# Patient Record
Sex: MALE | Race: WHITE | Employment: FULL TIME | URBAN - METROPOLITAN AREA
[De-identification: names, ages, dates, MRNs, and addresses within clinical notes are randomized per-mention and may not be internally consistent; named-entity substitution may affect disease eponyms.]

---

## 2019-04-11 ENCOUNTER — OFFICE VISIT (OUTPATIENT)
Dept: FAMILY MEDICINE | Facility: CLINIC | Age: 54
End: 2019-04-11
Payer: COMMERCIAL

## 2019-04-11 VITALS
HEART RATE: 63 BPM | DIASTOLIC BLOOD PRESSURE: 76 MMHG | TEMPERATURE: 97.2 F | OXYGEN SATURATION: 97 % | HEIGHT: 71 IN | WEIGHT: 198 LBS | SYSTOLIC BLOOD PRESSURE: 124 MMHG | BODY MASS INDEX: 27.72 KG/M2

## 2019-04-11 DIAGNOSIS — M79.604 PAIN OF RIGHT LOWER EXTREMITY: Primary | ICD-10-CM

## 2019-04-11 DIAGNOSIS — M79.671 RIGHT FOOT PAIN: ICD-10-CM

## 2019-04-11 PROCEDURE — 99214 OFFICE O/P EST MOD 30 MIN: CPT | Performed by: FAMILY MEDICINE

## 2019-04-11 ASSESSMENT — MIFFLIN-ST. JEOR: SCORE: 1742.31

## 2019-04-11 NOTE — PATIENT INSTRUCTIONS
Proceed with ultrasound .  Cares and symptomatic treatment discussed follow up if problem

## 2019-04-11 NOTE — PROGRESS NOTES
SUBJECTIVE:   Mark Ocampo is a 54 year old male who presents to clinic today for the following   health issues:      Behind RIGHT Knee  Pain/DVT check     Onset: x 2 months     Description:   Location: inside of medial side right knee, 2 months , couple of days ago suddenly felt  tightness, lump behind the knee in popliteal area , pressure  In calf but nothing too bad ,no calf swelling. No recall of injury but had been golfing and usually on his feet a lot   Character: Dull ache and sharp ain     Intensity: moderate, severe    Progression of Symptoms: worse ,     Accompanying Signs & Symptoms:  no risk of DV, no FAM hx of DVT etc but just  wanted to get checked   Other symptoms: rt  foot has been sore on and off few weeks , but leg pain started later. No swelling of feet . No fever or chills no sob etc     History:   Previous similar pain: no       Precipitating factors:   Trauma or overuse: no     Alleviating factors:  Improved by: nothing    Therapies Tried and outcome:         Additional history: as documented    Reviewed  and updated as needed this visit by clinical staff         Reviewed and updated as needed this visit by Provider         Patient Active Problem List   Diagnosis     Spinal stenosis, lumbar region, without neurogenic claudication     Hypertrophy of breast     Hyperhydrosis disorder     Shoulder pain, left     Past Surgical History:   Procedure Laterality Date     C SORTO W/O FACETEC FORAMOT/[facility]  VRT SEG, LUMBAR      of L5 done      C TWIST DRILL HOLE SUBDURAL/VENT On license of [facility]      1981 for SDH       Social History     Tobacco Use     Smoking status: Former Smoker     Packs/day: 1.00     Years: 12.00     Pack years: 12.00     Types: Cigarettes     Last attempt to quit: 9/10/2007     Years since quittin.5     Smokeless tobacco: Never Used   Substance Use Topics     Alcohol use: Yes     Comment: 2 times per month 1-8 per time     Family History   Problem Relation Age of Onset     Diabetes  "Paternal Aunt         type 2     Cerebrovascular Disease Paternal Grandfather      Heart Disease Paternal Grandfather         heart attacks     Breast Cancer Paternal Aunt      Breast Cancer Sister      Breast Cancer Maternal Aunt      Alcohol/Drug Mother         alcohol     Arthritis Mother         osteo     Gastrointestinal Disease Mother         gallbladder     Eye Disorder Paternal Grandmother         cateracts     Gastrointestinal Disease Maternal Uncle         gallbladder, ulcers     Gastrointestinal Disease Paternal Aunt         colostomy     Gastrointestinal Disease Sister         colon problems     Gynecology Sister         hyst     Psychotic Disorder Son         ADHD     Cerebrovascular Disease Father            ROS:  Constitutional, HEENT, cardiovascular, pulmonary, GI, , musculoskeletal, neuro, skin, endocrine and psych systems are negative, except as otherwise noted.    OBJECTIVE:     /76   Pulse 63   Temp 97.2  F (36.2  C) (Tympanic)   Ht 1.791 m (5' 10.5\")   Wt 89.8 kg (198 lb)   SpO2 97%   BMI 28.01 kg/m    Body mass index is 28.01 kg/m .  GENERAL: healthy, alert and no distress  NECK: no adenopathy,   RESP: lungs clear to auscultation - no rales, rhonchi or wheezes  CV: regular rate and rhythm, normal S1 S2, no S3 or S4, no murmur, click or rub  ABDOMEN: soft, nontender, no hepatosplenomegaly, no masses and bowel sounds normal  MS: Right knee good range of motion, there is tenderness mostly in the popliteal area and feels slightly swollen, no other tenderness around the joint, no no leg edema and tenderness to palpation in the calf.  Homans sign is equivocal.   Right foot also has a localized tenderness along the lateral mid arch is a bony prominence no obvious swelling erythema , range of motion of ankle is good.  NEURO: Normal strength and tone, mentation intact and speech normal.          ASSESSMENT/PLAN:       (M79.604) Pain of right lower extremity  (primary encounter " diagnosis)  Comment: has pain in rt poplitea area mostly   Plan: US Lower Extremity Venous Duplex Right      Discussed possible differential diagnosis for her symptoms.  Popliteal cyst versus rule out DVT.  Patient willing to proceed with ultrasound  Talked about warning s/s for which should be seen urgently     In the meantime symptomatic care as discussed.  Follow-up as needed      (M79.671) Right foot pain  Comment: localized later mid arch , not related to leg pain  Plan:   Discussed feet cares. Talked about comfortable shoes, and OTC pain med's as needed.  Cares and symptomatic treatment discussed follow up if problem     Patient expressed understanding and agreement with treatment plan. All patient's questions were answered, will let me know if has more later.  Medications: Rx's: Reviewed the potential side effects/complications of medications prescribed.       Miriam Fernandez MD  [facility] [place]

## 2019-04-12 ENCOUNTER — HOSPITAL ENCOUNTER (OUTPATIENT)
Dept: ULTRASOUND IMAGING | Facility: CLINIC | Age: 54
Discharge: HOME | End: 2019-04-12
Attending: FAMILY MEDICINE | Admitting: FAMILY MEDICINE
Payer: COMMERCIAL

## 2019-04-12 DIAGNOSIS — M79.604 PAIN OF RIGHT LOWER EXTREMITY: ICD-10-CM

## 2019-04-12 PROCEDURE — 93971 EXTREMITY STUDY: CPT | Mod: RT

## 2019-04-17 ENCOUNTER — OFFICE VISIT (OUTPATIENT)
Dept: FAMILY MEDICINE | Facility: CLINIC | Age: 54
End: 2019-04-17
Payer: COMMERCIAL

## 2019-04-17 VITALS
SYSTOLIC BLOOD PRESSURE: 120 MMHG | TEMPERATURE: 97.3 F | DIASTOLIC BLOOD PRESSURE: 70 MMHG | BODY MASS INDEX: 27.77 KG/M2 | WEIGHT: 194 LBS | HEIGHT: 70 IN | HEART RATE: 68 BPM

## 2019-04-17 DIAGNOSIS — Z13.6 CARDIOVASCULAR SCREENING; LDL GOAL LESS THAN 160: Primary | ICD-10-CM

## 2019-04-17 DIAGNOSIS — Z12.11 SCREEN FOR COLON CANCER: ICD-10-CM

## 2019-04-17 DIAGNOSIS — Z12.5 SCREENING FOR PROSTATE CANCER: ICD-10-CM

## 2019-04-17 DIAGNOSIS — Z00.00 ANNUAL PHYSICAL EXAM: ICD-10-CM

## 2019-04-17 PROCEDURE — G0103 PSA SCREENING: HCPCS | Performed by: FAMILY MEDICINE

## 2019-04-17 PROCEDURE — 99396 PREV VISIT EST AGE 40-64: CPT | Performed by: FAMILY MEDICINE

## 2019-04-17 PROCEDURE — 36415 COLL VENOUS BLD VENIPUNCTURE: CPT | Performed by: FAMILY MEDICINE

## 2019-04-17 PROCEDURE — 80053 COMPREHEN METABOLIC PANEL: CPT | Performed by: FAMILY MEDICINE

## 2019-04-17 PROCEDURE — 80061 LIPID PANEL: CPT | Performed by: FAMILY MEDICINE

## 2019-04-17 SDOH — HEALTH STABILITY: MENTAL HEALTH: HOW OFTEN DO YOU HAVE A DRINK CONTAINING ALCOHOL?: 2-4 TIMES A MONTH

## 2019-04-17 ASSESSMENT — MIFFLIN-ST. JEOR: SCORE: 1716.23

## 2019-04-17 NOTE — PROGRESS NOTES
SUBJECTIVE:   CC: Mark Ocampo is an 54 year old male who presents for preventive health visit.     Healthy Habits:    Do you get at least three servings of calcium containing foods daily (dairy, green leafy vegetables, etc.)? yes    Amount of exercise or daily activities, outside of work: 7 days per week, less now due to knee pain    Problems taking medications regularly not applicable    Medication side effects: No    Have you had an eye exam in the past two years? no    Do you see a dentist twice per year? no    Do you have sleep apnea, excessive snoring or daytime drowsiness?no    No concerns.    Today's PHQ-2 Score:   PHQ-2 (  [employer]) 2019   Q1: Little interest or pleasure in doing things 0 0   Q2: Feeling down, depressed or hopeless 0 0   PHQ-2 Score 0 0       Abuse: Current or Past(Physical, Sexual or Emotional)- No  Do you feel safe in your environment? Yes    Social History     Tobacco Use     Smoking status: Former Smoker     Packs/day: 1.00     Years: 12.00     Pack years: 12.00     Types: Cigarettes     Last attempt to quit: 9/10/2007     Years since quittin.6     Smokeless tobacco: Never Used   Substance Use Topics     Alcohol use: Yes     Frequency: 2-4 times a month     If you drink alcohol do you typically have >3 drinks per day or >7 drinks per week? No                      Last PSA: No results found for: PSA    Reviewed orders with patient. Reviewed health maintenance and updated orders accordingly - Yes      Reviewed and updated as needed this visit by clinical staff  Tobacco  Allergies  Meds  Fam Hx  Soc Hx        Reviewed and updated as needed this visit by Provider            ROS:  CONSTITUTIONAL: NEGATIVE for fever, chills, change in weight  INTEGUMENTARY/SKIN: NEGATIVE for worrisome rashes, moles or lesions  EYES: NEGATIVE for vision changes or irritation  ENT: NEGATIVE for ear, mouth and throat problems  RESP: NEGATIVE for significant cough or SOB  CV:  "NEGATIVE for chest pain, palpitations or peripheral edema  GI: NEGATIVE for nausea, abdominal pain, heartburn, or change in bowel habits   male: negative for dysuria, hematuria, decreased urinary stream, erectile dysfunction, urethral discharge  MUSCULOSKELETAL: NEGATIVE for significant arthralgias or myalgia  NEURO: NEGATIVE for weakness, dizziness or paresthesias  PSYCHIATRIC: NEGATIVE for changes in mood or affect    OBJECTIVE:   /70   Pulse 68   Temp 97.3  F (36.3  C) (Tympanic)   Ht 1.778 m (5' 10\")   Wt 88 kg (194 lb)   BMI 27.84 kg/m    EXAM:  GENERAL: healthy, alert and no distress  EYES: Eyes grossly normal to inspection, PERRL and conjunctivae and sclerae normal  HENT: ear canals and TM's normal, nose and mouth without ulcers or lesions  NECK: no adenopathy, no asymmetry, masses, or scars and thyroid normal to palpation  RESP: lungs clear to auscultation - no rales, rhonchi or wheezes  CV: regular rate and rhythm, normal S1 S2, no S3 or S4, no murmur, click or rub, no peripheral edema and peripheral pulses strong  ABDOMEN: soft, nontender, no hepatosplenomegaly, no masses and bowel sounds normal  MS: no gross musculoskeletal defects noted, no edema  SKIN: no suspicious lesions or rashes  NEURO: Normal strength and tone, mentation intact and speech normal  PSYCH: mentation appears normal, affect normal/bright    Diagnostic Test Results:  none     ASSESSMENT/PLAN:   1. Annual physical exam    - Comprehensive metabolic panel  - Lipid panel reflex to direct LDL Fasting    2. Screen for colon cancer    - GASTROENTEROLOGY ADULT REF PROCEDURE ONLY [facility] [phone]    3. CARDIOVASCULAR SCREENING; LDL GOAL LESS THAN 160    - Comprehensive metabolic panel  - Lipid panel reflex to direct LDL Fasting    4. Screening for prostate cancer    - Prostate spec antigen screen    COUNSELING:  Reviewed preventive health counseling, as reflected in patient instructions       Regular exercise       " "Healthy diet/nutrition    BP Readings from Last 1 Encounters:   04/17/19 120/70     Estimated body mass index is 27.84 kg/m  as calculated from the following:    Height as of this encounter: 1.778 m (5' 10\").    Weight as of this encounter: 88 kg (194 lb).           reports that he quit smoking about 11 years ago. His smoking use included cigarettes. He has a 12.00 pack-year smoking history. He has never used smokeless tobacco.      Counseling Resources:  ATP IV Guidelines  Pooled Cohorts Equation Calculator  FRAX Risk Assessment  ICSI Preventive Guidelines  Dietary Guidelines for Americans, 2010  USDA's MyPlate  ASA Prophylaxis  Lung CA Screening    Edinson Burgess MD  [facility] [place]  "

## 2019-04-17 NOTE — LETTER
April 19, 2019      Mark Ocampo  [address] [address]        Dear ,        I have reviewed your recent labs. Here are the results:     -PSA (prostate specific antigen) test is normal.  This indicates a low likelihood of prostate cancer.  ADVISE: rechecking this in 1 year.   -Cholesterol levels (LDL,HDL, Triglycerides) are under range. LDL is mildly elevated but not above goal range.  ADVISE: rechecking  in 1 year.   -Liver and gallbladder tests are normal (ALT,AST, Alk phos, bilirubin), kidney function is normal (Cr, GFR), sodium is normal, potassium is normal, calcium is normal, glucose is normal.     Resulted Orders   Comprehensive metabolic panel   Result Value Ref Range    Sodium 142 133 - 144 mmol/L    Potassium 4.4 3.4 - 5.3 mmol/L    Chloride 108 94 - 109 mmol/L    Carbon Dioxide 26 20 - 32 mmol/L    Anion Gap 8 3 - 14 mmol/L    Glucose 90 70 - 99 mg/dL      Comment:      Non Fasting    Urea Nitrogen 11 7 - 30 mg/dL    Creatinine 0.89 0.66 - 1.25 mg/dL    GFR Estimate >90 >60 mL/min/[1.73_m2]      Comment:      Non  GFR Calc  Starting 12/18/2018, serum creatinine based estimated GFR (eGFR) will be   calculated using the Chronic Kidney Disease Epidemiology Collaboration   (CKD-EPI) equation.      GFR Estimate If Black >90 >60 mL/min/[1.73_m2]      Comment:       GFR Calc  Starting 12/18/2018, serum creatinine based estimated GFR (eGFR) will be   calculated using the Chronic Kidney Disease Epidemiology Collaboration   (CKD-EPI) equation.      Calcium 9.0 8.5 - 10.1 mg/dL    Bilirubin Total 0.6 0.2 - 1.3 mg/dL    Albumin 4.1 3.4 - 5.0 g/dL    Protein Total 7.3 6.8 - 8.8 g/dL    Alkaline Phosphatase 89 40 - 150 U/L    ALT 26 0 - 70 U/L    AST 21 0 - 45 U/L   Lipid panel reflex to direct LDL Fasting   Result Value Ref Range    Cholesterol 195 <200 mg/dL    Triglycerides 56 <150 mg/dL      Comment:      Non Fasting    HDL Cholesterol 50 >39 mg/dL     LDL Cholesterol Calculated 134 (H) <100 mg/dL      Comment:      Above desirable:  100-129 mg/dl  Borderline High:  130-159 mg/dL  High:             160-189 mg/dL  Very high:       >189 mg/dl      Non HDL Cholesterol 145 (H) <130 mg/dL      Comment:      Above Desirable:  130-159 mg/dl  Borderline high:  160-189 mg/dl  High:             190-219 mg/dl  Very high:       >219 mg/dl     Prostate spec antigen screen   Result Value Ref Range    PSA 1.05 0 - 4 ug/L      Comment:      Assay Method:  Chemiluminescence using Siemens Vista analyzer       If you have any questions or concerns, please call the clinic at the number listed above.       Sincerely,    Edinson Burgess MD

## 2019-04-18 LAB
ALBUMIN SERPL-MCNC: 4.1 G/DL (ref 3.4–5)
ALP SERPL-CCNC: 89 U/L (ref 40–150)
ALT SERPL W P-5'-P-CCNC: 26 U/L (ref 0–70)
ANION GAP SERPL CALCULATED.3IONS-SCNC: 8 MMOL/L (ref 3–14)
AST SERPL W P-5'-P-CCNC: 21 U/L (ref 0–45)
BILIRUB SERPL-MCNC: 0.6 MG/DL (ref 0.2–1.3)
BUN SERPL-MCNC: 11 MG/DL (ref 7–30)
CALCIUM SERPL-MCNC: 9 MG/DL (ref 8.5–10.1)
CHLORIDE SERPL-SCNC: 108 MMOL/L (ref 94–109)
CHOLEST SERPL-MCNC: 195 MG/DL
CO2 SERPL-SCNC: 26 MMOL/L (ref 20–32)
CREAT SERPL-MCNC: 0.89 MG/DL (ref 0.66–1.25)
GFR SERPL CREATININE-BSD FRML MDRD: >90 ML/MIN/{1.73_M2}
GLUCOSE SERPL-MCNC: 90 MG/DL (ref 70–99)
HDLC SERPL-MCNC: 50 MG/DL
LDLC SERPL CALC-MCNC: 134 MG/DL
NONHDLC SERPL-MCNC: 145 MG/DL
POTASSIUM SERPL-SCNC: 4.4 MMOL/L (ref 3.4–5.3)
PROT SERPL-MCNC: 7.3 G/DL (ref 6.8–8.8)
PSA SERPL-ACNC: 1.05 UG/L (ref 0–4)
SODIUM SERPL-SCNC: 142 MMOL/L (ref 133–144)
TRIGL SERPL-MCNC: 56 MG/DL

## 2019-05-22 ENCOUNTER — OFFICE VISIT (OUTPATIENT)
Dept: ORTHOPEDICS | Facility: CLINIC | Age: 54
End: 2019-05-22
Attending: FAMILY MEDICINE
Payer: COMMERCIAL

## 2019-05-22 ENCOUNTER — ANCILLARY PROCEDURE (OUTPATIENT)
Dept: GENERAL RADIOLOGY | Facility: CLINIC | Age: 54
End: 2019-05-22
Attending: FAMILY MEDICINE
Payer: COMMERCIAL

## 2019-05-22 VITALS
BODY MASS INDEX: 27.77 KG/M2 | HEIGHT: 70 IN | SYSTOLIC BLOOD PRESSURE: 120 MMHG | DIASTOLIC BLOOD PRESSURE: 70 MMHG | WEIGHT: 194 LBS

## 2019-05-22 DIAGNOSIS — M25.561 ACUTE PAIN OF RIGHT KNEE: ICD-10-CM

## 2019-05-22 DIAGNOSIS — M11.20 CHONDROCALCINOSIS: ICD-10-CM

## 2019-05-22 DIAGNOSIS — M25.561 ACUTE PAIN OF RIGHT KNEE: Primary | ICD-10-CM

## 2019-05-22 PROCEDURE — 73562 X-RAY EXAM OF KNEE 3: CPT | Performed by: FAMILY MEDICINE

## 2019-05-22 PROCEDURE — 20610 DRAIN/INJ JOINT/BURSA W/O US: CPT | Mod: RT | Performed by: FAMILY MEDICINE

## 2019-05-22 PROCEDURE — 99203 OFFICE O/P NEW LOW 30 MIN: CPT | Mod: 25 | Performed by: FAMILY MEDICINE

## 2019-05-22 RX ADMIN — TRIAMCINOLONE ACETONIDE 80 MG: 40 INJECTION, SUSPENSION INTRA-ARTICULAR; INTRAMUSCULAR at 16:03

## 2019-05-22 RX ADMIN — LIDOCAINE HYDROCHLORIDE 3 ML: 10 INJECTION, SOLUTION INFILTRATION; PERINEURAL at 16:03

## 2019-05-22 ASSESSMENT — MIFFLIN-ST. JEOR: SCORE: 1716.23

## 2019-05-22 NOTE — LETTER
5/22/2019         RE: Mark Ocampo  [address] [place] [id number]        Dear Colleague,    Thank you for referring your patient, Mark Ocampo, to the  [facility]. Please see a copy of my visit note below.    HPI     [facility]   Clinic Visit s May 22, 2019    PCP: Adarsh, Mora Flori [place]      Mark is a 54 year old male who is seen in consultation at the request of Dr. Fernandez for   Chief Complaint   Patient presents with     Right Knee - Pain       Injury: Patient describes injury from helping his child move. He hit his medial lower leg on the hitch of the van 6 months ago, no trouble after that.      Location of Pain: right knee medial, nonradiating   Duration of Pain: 6 weeks  Rating of Pain at worst: 8/10  Rating of Pain Currently: 3/10  Pain is better with: activity avoidance   Pain is worse with: walking   Treatment so far consists of: Pain medication: nabumetone (RELAFEN)  Associated symptoms: no distal numbness or tingling; denies swelling or warmth  Recent imaging completed: No recent imaging completed.  Prior History of related problems: none    Social History: is employed as a/an technician       Past Medical History:   Diagnosis Date     Subdural hemorrhage (H)      Urethral stricture unspecified     with benign mass by urethral meatus       Patient Active Problem List    Diagnosis Date Noted     Hyperhydrosis disorder 04/16/2016     Priority: Medium     Shoulder pain, left 04/16/2016     Priority: Medium     Spinal stenosis, lumbar region, without neurogenic claudication 12/10/2007     Priority: Medium     Hx of Laminectomy L5 previously, now L TLIF with decompression L5-S1 12/17/07       Hypertrophy of breast 12/10/2007     Priority: Medium       Family History   Problem Relation Age of Onset     Cerebrovascular Disease Paternal Grandfather      Heart Disease Paternal Grandfather         heart attacks     Alcohol/Drug Mother          "alcohol     Arthritis Mother         osteo     Gastrointestinal Disease Mother         gallbladder     Eye Disorder Paternal Grandmother         cateracts     Cerebrovascular Disease Father      Diabetes Paternal Aunt         type 2     Breast Cancer Paternal Aunt      Breast Cancer Maternal Aunt      Gastrointestinal Disease Maternal Uncle         gallbladder, ulcers     Gastrointestinal Disease Paternal Aunt         colostomy     Gastrointestinal Disease Sister         colon problems     Gynecology Sister         hyst     Psychotic Disorder Son         ADHD       Social History     Socioeconomic History     Marital status:      Spouse name: Not on file     Number of children: Not on file     Years of education: Not on file     Highest education level: Not on file   Occupational History     Not on file   Social Needs     Financial resource strain: Not on file     Food insecurity:     Worry: Not on file     Inability: Not on file     Transportation needs:     Medical: Not on file     Non-medical: Not on file   Tobacco Use     Smoking status: Former Smoker     Packs/day: 1.00     Years: 12.00     Pack years: 12.00     Types: Cigarettes     Last attempt to quit: 9/10/2007     Years since quittin.7     Smokeless tobacco: Never Used   Substance and Sexual Activity     Alcohol use: Yes     Frequency: 2-4 times a month     Drug use: Yes     Types: Marijuana       Past Surgical History:   Procedure Laterality Date     C SORTO W/O FACETEC FORAMOT/[facility]  VRT SEG, LUMBAR      of L5 done      C TWIST DRILL HOLE SUBDURAL/VENT P[facility]      1981 for SDH           Review of Systems   Musculoskeletal: Positive for back pain and joint pain.   All other systems reviewed and are negative.        Physical Exam   Musculoskeletal:        Right knee: He exhibits no effusion.     /70   Ht 1.778 m (5' 10\")   Wt 88 kg (194 lb)   BMI 27.84 kg/m     Constitutional:well-developed, well-nourished, and in no distress. "   Cardiovascular: Intact distal pulses.    Neurological: alert. Gait Abnormal:   Antalgic gait  Skin: Skin is warm and dry.   Psychiatric: Mood and affect normal.   Respiratory: unlabored, speaks in full sentences  Lymph: no LAD, no lymphangitis            Right Knee Exam     Muscle Strength   The patient has normal right knee strength.    Tenderness   The patient is experiencing tenderness in the medial joint line.    Range of Motion   Extension: normal   Flexion: normal     Tests   Luiz:  Medial - positive Lateral - negative  Varus: negative Valgus: negative  Lachman:  Anterior - negative      Drawer:  Anterior - negative    Posterior - negative  Patellar apprehension: negative    Other   Erythema: absent  Scars: absent  Sensation: normal  Pulse: present  Swelling: none  Effusion: no effusion present               X-ray images Ordered and independently reviewed by me in the office today with the patient. X-ray shows:     5/24/2019     Trace degenerative changes of bilateral knees with minor osteophytes seen, but on the right knee there are intra-articular calcifications consistent with chondrocalcinosis.  No acute fractures noted otherwise.         VENOUS DOPPLER RIGHT LOWER EXTREMITY 4/12/2019 10:37 AM     HISTORY: Right lower extremity pain.     COMPARISON: None.     FINDINGS: Color-flow imaging and Doppler waveform spectral analysis  were utilized. There is normal compressibility and spontaneous flow  throughout the right common femoral, superficial femoral, popliteal  and visualized calf veins. Incidental 4.4 x 3.5 x 1.5 cm popliteal  cyst.                                                                      IMPRESSION: No evidence for deep venous thrombosis.     KRUPA MAHARAJ MD        ASSESSMENT/PLAN    ICD-10-CM    1. Acute pain of right knee M25.561 XR Knee Standing AP Bilat [place] Bilat Lat Right   2. Chondrocalcinosis M11.20      Idiopathic chondrocalcinosis of the knee, with a history of trauma  both the distant past but nothing recent, and otherwise stable knee.  Mild degenerative changes noted as well.  Advanced imaging may be useful, although empiric cortisone injection offered for pain control.  Patient opted for the latter, will return if symptoms fail to resolve or worsen.    Large Joint Injection/Arthocentesis: R knee joint  Date/Time: 5/22/2019 4:03 PM  Performed by: Cyril Orr MD  Authorized by: Cyril Orr MD     Indications:  Pain and osteoarthritis  Needle Size:  25 G  Guidance: landmark guided    Approach:  Superolateral  Location:  Knee      Medications:  3 mL lidocaine 1 %; 80 mg triamcinolone 40 MG/ML  Outcome:  Tolerated well, no immediate complications  Procedure discussed: discussed risks, benefits, and alternatives    Consent Given by:  Patient  Timeout: timeout called immediately prior to procedure    Prep: patient was prepped and draped in usual sterile fashion     Lot: [id number] EXP: 1/2021              Again, thank you for allowing me to participate in the care of your patient.        Sincerely,        Cyril Orr MD

## 2019-05-22 NOTE — PROGRESS NOTES
HPI     [facility]   Clinic Visit s May 22, 2019    PCP: Adarsh, Nolberto Ruby Mark Flores is a 54 year old male who is seen in consultation at the request of Dr. Fernandez for   Chief Complaint   Patient presents with     Right Knee - Pain       Injury: Patient describes injury from helping his child move. He hit his medial lower leg on the hitch of the van 6 months ago, no trouble after that.      Location of Pain: right knee medial, nonradiating   Duration of Pain: 6 weeks  Rating of Pain at worst: 8/10  Rating of Pain Currently: 3/10  Pain is better with: activity avoidance   Pain is worse with: walking   Treatment so far consists of: Pain medication: nabumetone (RELAFEN)  Associated symptoms: no distal numbness or tingling; denies swelling or warmth  Recent imaging completed: No recent imaging completed.  Prior History of related problems: none    Social History: is employed as a/an technician       Past Medical History:   Diagnosis Date     Subdural hemorrhage (H)      Urethral stricture unspecified     with benign mass by urethral meatus       Patient Active Problem List    Diagnosis Date Noted     Hyperhydrosis disorder 04/16/2016     Priority: Medium     Shoulder pain, left 04/16/2016     Priority: Medium     Spinal stenosis, lumbar region, without neurogenic claudication 12/10/2007     Priority: Medium     Hx of Laminectomy L5 previously, now L TLIF with decompression L5-S1 12/17/07       Hypertrophy of breast 12/10/2007     Priority: Medium       Family History   Problem Relation Age of Onset     Cerebrovascular Disease Paternal Grandfather      Heart Disease Paternal Grandfather         heart attacks     Alcohol/Drug Mother         alcohol     Arthritis Mother         osteo     Gastrointestinal Disease Mother         gallbladder     Eye Disorder Paternal Grandmother         cateracts     Cerebrovascular Disease Father      Diabetes Paternal Aunt         type 2     Breast  "Cancer Paternal Aunt      Breast Cancer Maternal Aunt      Gastrointestinal Disease Maternal Uncle         gallbladder, ulcers     Gastrointestinal Disease Paternal Aunt         colostomy     Gastrointestinal Disease Sister         colon problems     Gynecology Sister         hyst     Psychotic Disorder Son         ADHD       Social History     Socioeconomic History     Marital status:      Spouse name: Not on file     Number of children: Not on file     Years of education: Not on file     Highest education level: Not on file   Occupational History     Not on file   Social Needs     Financial resource strain: Not on file     Food insecurity:     Worry: Not on file     Inability: Not on file     Transportation needs:     Medical: Not on file     Non-medical: Not on file   Tobacco Use     Smoking status: Former Smoker     Packs/day: 1.00     Years: 12.00     Pack years: 12.00     Types: Cigarettes     Last attempt to quit: 9/10/2007     Years since quittin.7     Smokeless tobacco: Never Used   Substance and Sexual Activity     Alcohol use: Yes     Frequency: 2-4 times a month     Drug use: Yes     Types: Marijuana       Past Surgical History:   Procedure Laterality Date     C SORTO W/O FACETEC FORAMOT/[facility]  VRT SEG, LUMBAR      of L5 done      C TWIST DRILL HOLE SUBDURAL/VENT PUNC      1981 for SDH           Review of Systems   Musculoskeletal: Positive for back pain and joint pain.   All other systems reviewed and are negative.        Physical Exam   Musculoskeletal:        Right knee: He exhibits no effusion.     /70   Ht 1.778 m (5' 10\")   Wt 88 kg (194 lb)   BMI 27.84 kg/m    Constitutional:well-developed, well-nourished, and in no distress.   Cardiovascular: Intact distal pulses.    Neurological: alert. Gait Abnormal:   Antalgic gait  Skin: Skin is warm and dry.   Psychiatric: Mood and affect normal.   Respiratory: unlabored, speaks in full sentences  Lymph: no LAD, no " lymphangitis            Right Knee Exam     Muscle Strength   The patient has normal right knee strength.    Tenderness   The patient is experiencing tenderness in the medial joint line.    Range of Motion   Extension: normal   Flexion: normal     Tests   Luiz:  Medial - positive Lateral - negative  Varus: negative Valgus: negative  Lachman:  Anterior - negative      Drawer:  Anterior - negative    Posterior - negative  Patellar apprehension: negative    Other   Erythema: absent  Scars: absent  Sensation: normal  Pulse: present  Swelling: none  Effusion: no effusion present               X-ray images Ordered and independently reviewed by me in the office today with the patient. X-ray shows:     5/24/2019     Trace degenerative changes of bilateral knees with minor osteophytes seen, but on the right knee there are intra-articular calcifications consistent with chondrocalcinosis.  No acute fractures noted otherwise.         VENOUS DOPPLER RIGHT LOWER EXTREMITY 4/12/2019 10:37 AM     HISTORY: Right lower extremity pain.     COMPARISON: None.     FINDINGS: Color-flow imaging and Doppler waveform spectral analysis  were utilized. There is normal compressibility and spontaneous flow  throughout the right common femoral, superficial femoral, popliteal  and visualized calf veins. Incidental 4.4 x 3.5 x 1.5 cm popliteal  cyst.                                                                      IMPRESSION: No evidence for deep venous thrombosis.     KRUPA MAHARAJ MD        ASSESSMENT/PLAN    ICD-10-CM    1. Acute pain of right knee M25.561 XR Knee Standing AP Bilat Travilah Bilat Lat Right   2. Chondrocalcinosis M11.20      Idiopathic chondrocalcinosis of the knee, with a history of trauma both the distant past but nothing recent, and otherwise stable knee.  Mild degenerative changes noted as well.  Advanced imaging may be useful, although empiric cortisone injection offered for pain control.  Patient opted for the  latter, will return if symptoms fail to resolve or worsen.    Large Joint Injection/Arthocentesis: R knee joint  Date/Time: 5/22/2019 4:03 PM  Performed by: Cyril Orr MD  Authorized by: Cyril Orr MD     Indications:  Pain and osteoarthritis  Needle Size:  25 G  Guidance: landmark guided    Approach:  Superolateral  Location:  Knee      Medications:  3 mL lidocaine 1 %; 80 mg triamcinolone 40 MG/ML  Outcome:  Tolerated well, no immediate complications  Procedure discussed: discussed risks, benefits, and alternatives    Consent Given by:  Patient  Timeout: timeout called immediately prior to procedure    Prep: patient was prepped and draped in usual sterile fashion     Lot: [id number] EXP: 1/2021

## 2019-05-27 PROBLEM — M11.20 CHONDROCALCINOSIS: Status: ACTIVE | Noted: 2019-05-27

## 2019-05-27 RX ORDER — TRIAMCINOLONE ACETONIDE 40 MG/ML
80 INJECTION, SUSPENSION INTRA-ARTICULAR; INTRAMUSCULAR
Status: SHIPPED | OUTPATIENT
Start: 2019-05-22

## 2019-05-27 RX ORDER — LIDOCAINE HYDROCHLORIDE 10 MG/ML
3 INJECTION, SOLUTION INFILTRATION; PERINEURAL
Status: SHIPPED | OUTPATIENT
Start: 2019-05-22

## 2019-05-27 ASSESSMENT — ENCOUNTER SYMPTOMS: BACK PAIN: 1

## 2019-09-29 ENCOUNTER — HEALTH MAINTENANCE LETTER (OUTPATIENT)
Age: 54
End: 2019-09-29

## 2019-12-09 ENCOUNTER — OFFICE VISIT (OUTPATIENT)
Dept: FAMILY MEDICINE | Facility: CLINIC | Age: 54
End: 2019-12-09
Payer: OTHER MISCELLANEOUS

## 2019-12-09 VITALS
BODY MASS INDEX: 28.63 KG/M2 | WEIGHT: 200 LBS | OXYGEN SATURATION: 96 % | HEART RATE: 68 BPM | TEMPERATURE: 97.5 F | HEIGHT: 70 IN | DIASTOLIC BLOOD PRESSURE: 72 MMHG | SYSTOLIC BLOOD PRESSURE: 118 MMHG

## 2019-12-09 DIAGNOSIS — Z01.818 PREOP GENERAL PHYSICAL EXAM: Primary | ICD-10-CM

## 2019-12-09 DIAGNOSIS — S89.91XS INJURY OF RIGHT KNEE, SEQUELA: ICD-10-CM

## 2019-12-09 LAB — HGB BLD-MCNC: 13.9 G/DL (ref 13.3–17.7)

## 2019-12-09 PROCEDURE — 99214 OFFICE O/P EST MOD 30 MIN: CPT | Performed by: FAMILY MEDICINE

## 2019-12-09 PROCEDURE — 85018 HEMOGLOBIN: CPT | Performed by: FAMILY MEDICINE

## 2019-12-09 PROCEDURE — 36415 COLL VENOUS BLD VENIPUNCTURE: CPT | Performed by: FAMILY MEDICINE

## 2019-12-09 ASSESSMENT — MIFFLIN-ST. JEOR: SCORE: 1743.44

## 2021-01-14 ENCOUNTER — HEALTH MAINTENANCE LETTER (OUTPATIENT)
Age: 56
End: 2021-01-14

## 2021-10-24 ENCOUNTER — HEALTH MAINTENANCE LETTER (OUTPATIENT)
Age: 56
End: 2021-10-24

## 2022-02-13 ENCOUNTER — HEALTH MAINTENANCE LETTER (OUTPATIENT)
Age: 57
End: 2022-02-13

## 2022-10-15 ENCOUNTER — HEALTH MAINTENANCE LETTER (OUTPATIENT)
Age: 57
End: 2022-10-15

## 2023-03-26 ENCOUNTER — HEALTH MAINTENANCE LETTER (OUTPATIENT)
Age: 58
End: 2023-03-26